# Patient Record
Sex: FEMALE
[De-identification: names, ages, dates, MRNs, and addresses within clinical notes are randomized per-mention and may not be internally consistent; named-entity substitution may affect disease eponyms.]

---

## 2018-06-03 ENCOUNTER — HOSPITAL ENCOUNTER (EMERGENCY)
Dept: HOSPITAL 90 - EDH | Age: 73
Discharge: HOME | End: 2018-06-03
Payer: MEDICARE
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
## 2018-06-03 DIAGNOSIS — E78.00: ICD-10-CM
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
## 2018-06-03 DIAGNOSIS — I10: ICD-10-CM

## 2018-06-03 DIAGNOSIS — Z98.51: ICD-10-CM

## 2018-06-03 DIAGNOSIS — E11.9: ICD-10-CM

## 2018-06-03 DIAGNOSIS — Z90.49: ICD-10-CM

## 2018-06-03 DIAGNOSIS — Z88.0: ICD-10-CM

## 2018-06-03 DIAGNOSIS — S93.512A: Primary | ICD-10-CM

## 2018-06-03 PROCEDURE — 73660 X-RAY EXAM OF TOE(S): CPT

## 2018-06-25 ENCOUNTER — HOSPITAL ENCOUNTER (OUTPATIENT)
Dept: HOSPITAL 90 - RAH | Age: 73
Discharge: HOME | End: 2018-06-25
Attending: INTERNAL MEDICINE
Payer: MEDICARE

## 2018-06-25 DIAGNOSIS — Z12.31: Primary | ICD-10-CM

## 2018-06-25 PROCEDURE — 77067 SCR MAMMO BI INCL CAD: CPT

## 2019-03-22 ENCOUNTER — HOSPITAL ENCOUNTER (EMERGENCY)
Dept: HOSPITAL 90 - EDH | Age: 74
Discharge: HOME | End: 2019-03-22
Payer: MEDICARE

## 2019-03-22 DIAGNOSIS — M17.12: Primary | ICD-10-CM

## 2019-03-22 DIAGNOSIS — I10: ICD-10-CM

## 2019-03-22 DIAGNOSIS — Z88.0: ICD-10-CM

## 2019-03-22 DIAGNOSIS — E78.00: ICD-10-CM

## 2019-03-22 DIAGNOSIS — E11.9: ICD-10-CM

## 2019-03-22 DIAGNOSIS — Z98.51: ICD-10-CM

## 2019-03-22 PROCEDURE — 99281 EMR DPT VST MAYX REQ PHY/QHP: CPT

## 2020-01-17 ENCOUNTER — HOSPITAL ENCOUNTER (OUTPATIENT)
Dept: HOSPITAL 90 - RAH | Age: 75
Discharge: HOME | End: 2020-01-17
Attending: INTERNAL MEDICINE
Payer: COMMERCIAL

## 2020-01-17 DIAGNOSIS — Z12.31: Primary | ICD-10-CM

## 2020-01-17 PROCEDURE — 77067 SCR MAMMO BI INCL CAD: CPT

## 2021-04-27 ENCOUNTER — HOSPITAL ENCOUNTER (OUTPATIENT)
Dept: HOSPITAL 90 - OIH | Age: 76
Discharge: HOME | End: 2021-04-27
Attending: INTERNAL MEDICINE
Payer: COMMERCIAL

## 2021-04-27 DIAGNOSIS — M47.26: Primary | ICD-10-CM

## 2021-04-27 PROCEDURE — 72110 X-RAY EXAM L-2 SPINE 4/>VWS: CPT

## 2021-07-13 ENCOUNTER — HOSPITAL ENCOUNTER (OUTPATIENT)
Dept: HOSPITAL 90 - RAH | Age: 76
Discharge: HOME | End: 2021-07-13
Attending: INTERNAL MEDICINE
Payer: COMMERCIAL

## 2021-07-13 DIAGNOSIS — N17.9: ICD-10-CM

## 2021-07-13 DIAGNOSIS — I70.1: Primary | ICD-10-CM

## 2021-07-13 PROCEDURE — 93975 VASCULAR STUDY: CPT

## 2021-07-13 PROCEDURE — 76770 US EXAM ABDO BACK WALL COMP: CPT

## 2022-03-02 ENCOUNTER — HOSPITAL ENCOUNTER (OUTPATIENT)
Dept: HOSPITAL 90 - RAH | Age: 77
Discharge: HOME | End: 2022-03-02
Attending: INTERNAL MEDICINE
Payer: COMMERCIAL

## 2022-03-02 DIAGNOSIS — M48.061: ICD-10-CM

## 2022-03-02 DIAGNOSIS — M47.816: Primary | ICD-10-CM

## 2022-03-02 PROCEDURE — 72148 MRI LUMBAR SPINE W/O DYE: CPT

## 2022-10-31 ENCOUNTER — HOSPITAL ENCOUNTER (EMERGENCY)
Dept: HOSPITAL 90 - EDH | Age: 77
Discharge: HOME | End: 2022-10-31
Payer: COMMERCIAL

## 2022-10-31 VITALS — SYSTOLIC BLOOD PRESSURE: 154 MMHG | DIASTOLIC BLOOD PRESSURE: 92 MMHG

## 2022-10-31 DIAGNOSIS — J10.1: Primary | ICD-10-CM

## 2022-10-31 DIAGNOSIS — E78.00: ICD-10-CM

## 2022-10-31 DIAGNOSIS — Z20.822: ICD-10-CM

## 2022-10-31 DIAGNOSIS — Z88.0: ICD-10-CM

## 2022-10-31 DIAGNOSIS — I10: ICD-10-CM

## 2022-10-31 DIAGNOSIS — E11.9: ICD-10-CM

## 2022-10-31 LAB
ALBUMIN SERPL-MCNC: 3.2 G/DL (ref 3.5–5)
ALT SERPL-CCNC: 28 U/L (ref 12–78)
AST SERPL-CCNC: 31 U/L (ref 10–37)
BASOPHILS NFR BLD AUTO: 0.2 % (ref 0–5)
BUN SERPL-MCNC: 13 MG/DL (ref 7–18)
CHLORIDE SERPL-SCNC: 106 MMOL/L (ref 101–111)
CO2 SERPL-SCNC: 23 MMOL/L (ref 21–32)
CREAT SERPL-MCNC: 0.5 MG/DL (ref 0.5–1.5)
EOSINOPHIL NFR BLD AUTO: 0.2 % (ref 0–8)
ERYTHROCYTE [DISTWIDTH] IN BLOOD BY AUTOMATED COUNT: 14.1 % (ref 11–15.5)
GFR SERPL CREATININE-BSD FRML MDRD: 128 ML/MIN (ref 60–?)
GLUCOSE SERPL-MCNC: 173 MG/DL (ref 70–105)
HCT VFR BLD AUTO: 40.4 % (ref 36–48)
LYMPHOCYTES NFR SPEC AUTO: 23.5 % (ref 21–51)
MCH RBC QN AUTO: 28.1 PG (ref 27–33)
MCHC RBC AUTO-ENTMCNC: 32.2 G/DL (ref 32–36)
MCV RBC AUTO: 87.3 FL (ref 79–99)
MONOCYTES NFR BLD AUTO: 12.9 % (ref 3–13)
NEUTROPHILS NFR BLD AUTO: 62.2 % (ref 40–77)
NRBC BLD MANUAL-RTO: 0 % (ref 0–0.19)
PLATELET # BLD AUTO: 186 K/UL (ref 130–400)
POTASSIUM SERPL-SCNC: 4.3 MMOL/L (ref 3.5–5.1)
PROT SERPL-MCNC: 6.7 G/DL (ref 6–8.3)
RBC # BLD AUTO: 4.63 MIL/UL (ref 4–5.5)
SODIUM SERPL-SCNC: 138 MMOL/L (ref 136–145)
WBC # BLD AUTO: 5 K/UL (ref 4.8–10.8)

## 2022-10-31 PROCEDURE — 80053 COMPREHEN METABOLIC PANEL: CPT

## 2022-10-31 PROCEDURE — 87635 SARS-COV-2 COVID-19 AMP PRB: CPT

## 2022-10-31 PROCEDURE — 96375 TX/PRO/DX INJ NEW DRUG ADDON: CPT

## 2022-10-31 PROCEDURE — 71045 X-RAY EXAM CHEST 1 VIEW: CPT

## 2022-10-31 PROCEDURE — 99284 EMERGENCY DEPT VISIT MOD MDM: CPT

## 2022-10-31 PROCEDURE — 36415 COLL VENOUS BLD VENIPUNCTURE: CPT

## 2022-10-31 PROCEDURE — 85025 COMPLETE CBC W/AUTO DIFF WBC: CPT

## 2022-10-31 PROCEDURE — 96374 THER/PROPH/DIAG INJ IV PUSH: CPT

## 2022-10-31 PROCEDURE — 87804 INFLUENZA ASSAY W/OPTIC: CPT

## 2022-10-31 PROCEDURE — 87880 STREP A ASSAY W/OPTIC: CPT

## 2024-02-16 ENCOUNTER — HOSPITAL ENCOUNTER (OUTPATIENT)
Dept: HOSPITAL 90 - LAB | Age: 79
Discharge: HOME | End: 2024-02-16
Attending: INTERNAL MEDICINE
Payer: COMMERCIAL

## 2024-02-16 DIAGNOSIS — M54.2: ICD-10-CM

## 2024-02-16 DIAGNOSIS — Z90.49: ICD-10-CM

## 2024-02-16 DIAGNOSIS — M47.815: ICD-10-CM

## 2024-02-16 DIAGNOSIS — Z01.818: Primary | ICD-10-CM

## 2024-02-16 DIAGNOSIS — D69.2: ICD-10-CM

## 2024-02-16 LAB
ALBUMIN SERPL-MCNC: 3.3 G/DL (ref 3.5–5)
ALT SERPL-CCNC: 20 U/L (ref 12–78)
APTT PPP: 28.1 SEC (ref 26.3–35.5)
AST SERPL-CCNC: 8 U/L (ref 10–37)
BASOPHILS # BLD AUTO: 0.02 K/UL (ref 0–0.2)
BASOPHILS NFR BLD AUTO: 0.3 % (ref 0–5)
BILIRUB SERPL-MCNC: 0.4 MG/DL (ref 0.2–1)
BUN SERPL-MCNC: 14 MG/DL (ref 7–18)
CHLORIDE SERPL-SCNC: 101 MMOL/L (ref 101–111)
CO2 SERPL-SCNC: 28 MMOL/L (ref 21–32)
CREAT SERPL-MCNC: 0.8 MG/DL (ref 0.5–1.5)
EOSINOPHIL # BLD AUTO: 0.03 K/UL (ref 0–0.7)
EOSINOPHIL NFR BLD AUTO: 0.5 % (ref 0–8)
ERYTHROCYTE [DISTWIDTH] IN BLOOD BY AUTOMATED COUNT: 15 % (ref 11–15.5)
GFR SERPL CREATININE-BSD FRML MDRD: 76 ML/MIN (ref 90–?)
GLUCOSE SERPL-MCNC: 311 MG/DL (ref 70–105)
HCT VFR BLD AUTO: 39.9 % (ref 36–48)
IMM GRANULOCYTES # BLD: 0.08 K/UL (ref 0–1)
INR PPP: <= 0.93 (ref 0.85–1.15)
LYMPHOCYTES # SPEC AUTO: 1.7 K/UL (ref 1–4.8)
LYMPHOCYTES NFR SPEC AUTO: 28.3 % (ref 21–51)
MCH RBC QN AUTO: 27.5 PG (ref 27–33)
MCHC RBC AUTO-ENTMCNC: 32.1 G/DL (ref 32–36)
MCV RBC AUTO: 85.8 FL (ref 79–99)
MONOCYTES # BLD AUTO: 0.6 K/UL (ref 0.1–1)
MONOCYTES NFR BLD AUTO: 9.1 % (ref 3–13)
NEUTROPHILS # BLD AUTO: 3.7 K/UL (ref 1.8–7.7)
NEUTROPHILS NFR BLD AUTO: 60.5 % (ref 40–77)
NRBC BLD MANUAL-RTO: 0 % (ref 0–0.19)
PLATELET # BLD AUTO: 189 K/UL (ref 130–400)
POTASSIUM SERPL-SCNC: 4.9 MMOL/L (ref 3.5–5.1)
PROT SERPL-MCNC: 6.8 G/DL (ref 6–8.3)
PROTHROMBIN TIME: 10.6 SEC (ref 9.6–11.6)
RBC # BLD AUTO: 4.65 MIL/UL (ref 4–5.5)
SODIUM SERPL-SCNC: 137 MMOL/L (ref 136–145)
WBC # BLD AUTO: 6.1 K/UL (ref 4.8–10.8)

## 2024-02-16 PROCEDURE — 85025 COMPLETE CBC W/AUTO DIFF WBC: CPT

## 2024-02-16 PROCEDURE — 80053 COMPREHEN METABOLIC PANEL: CPT

## 2024-02-16 PROCEDURE — 85610 PROTHROMBIN TIME: CPT

## 2024-02-16 PROCEDURE — 71046 X-RAY EXAM CHEST 2 VIEWS: CPT

## 2024-02-16 PROCEDURE — 36415 COLL VENOUS BLD VENIPUNCTURE: CPT

## 2024-02-16 PROCEDURE — 85730 THROMBOPLASTIN TIME PARTIAL: CPT

## 2025-01-25 ENCOUNTER — HOSPITAL ENCOUNTER (EMERGENCY)
Dept: HOSPITAL 90 - EDH | Age: 80
Discharge: LEFT BEFORE DISCHARGE | End: 2025-01-25
Payer: COMMERCIAL

## 2025-01-25 VITALS — BODY MASS INDEX: 28.65 KG/M2 | HEIGHT: 60 IN | WEIGHT: 145.95 LBS

## 2025-01-25 VITALS
RESPIRATION RATE: 18 BRPM | HEART RATE: 69 BPM | DIASTOLIC BLOOD PRESSURE: 73 MMHG | SYSTOLIC BLOOD PRESSURE: 168 MMHG | TEMPERATURE: 98 F
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
## 2025-01-25 DIAGNOSIS — M79.641: Primary | ICD-10-CM
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
## 2025-01-25 DIAGNOSIS — Z53.21: ICD-10-CM
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
## 2025-01-25 NOTE — NUR
NO ANSWER WHEN CALLED FOR ROOM PLACEMENT, NOT IN LOBBY, NOT IN BATHROOM, NOT 
OUTSIDE, NOT IN RADIOLOGY, NOT WITH MD OR PA